# Patient Record
(demographics unavailable — no encounter records)

---

## 2025-03-03 NOTE — PHYSICAL EXAM

## 2025-03-03 NOTE — DISCUSSION/SUMMARY
[FreeTextEntry1] : 79 yo lady PMHx of HTN, mild AI, and HLD  Assessment 1. Lower extremity edema - pitting edema b/l ankles - resolved after discontinuing amlodipine No evidence of CKD/proteinuria or hepatic disease ProBNP, TTE and LE venous duplex were all normal 2. HTN 3. HLD 4. 1st deg AV block 5. Mild dyspnea  Plan 1. Lisinopril 10mg PO QD 2. RTC 6mo w/ TTE  During non face-to-face time, I reviewed relevant portions of the patient's medical record. During face-to-face time, I took a relevant history and examined the patient. I also explained differential diagnoses, relevant cardiac diagnoses, workup, and management plan, which required a moderate level of medical decision making. I answered all questions related to the patient's medical conditions.   Marisel Lugo M.D. Attending Cardiologist [facility]

## 2025-03-03 NOTE — DISCUSSION/SUMMARY
[FreeTextEntry1] : 77 yo lady PMHx of HTN, mild AI, and HLD  Assessment 1. Lower extremity edema - pitting edema b/l ankles - resolved after discontinuing amlodipine No evidence of CKD/proteinuria or hepatic disease ProBNP, TTE and LE venous duplex were all normal 2. HTN 3. HLD 4. 1st deg AV block 5. Mild dyspnea  Plan 1. Lisinopril 10mg PO QD 2. RTC 6mo w/ TTE  During non face-to-face time, I reviewed relevant portions of the patient's medical record. During face-to-face time, I took a relevant history and examined the patient. I also explained differential diagnoses, relevant cardiac diagnoses, workup, and management plan, which required a moderate level of medical decision making. I answered all questions related to the patient's medical conditions.   Marisel Lugo M.D. Attending Cardiologist [facility]

## 2025-03-03 NOTE — HISTORY OF PRESENT ILLNESS
[FreeTextEntry1] : 79 yo lady PMHx of HTN, mild AI, and HLD  03/03/25 Headache in the morning. Mild dyspnea. 08/29/24: Still with intermittent pedal pitting edema 08/15/24 ROS + acute onset edema ~2 weeks ago b/l LE mostly in ankle and feet. No dyspnea or chest pain.